# Patient Record
Sex: MALE | Race: AMERICAN INDIAN OR ALASKA NATIVE
[De-identification: names, ages, dates, MRNs, and addresses within clinical notes are randomized per-mention and may not be internally consistent; named-entity substitution may affect disease eponyms.]

---

## 2019-03-03 ENCOUNTER — HOSPITAL ENCOUNTER (EMERGENCY)
Dept: HOSPITAL 42 - ED | Age: 19
Discharge: HOME | End: 2019-03-03
Payer: SELF-PAY

## 2019-03-03 VITALS
DIASTOLIC BLOOD PRESSURE: 71 MMHG | TEMPERATURE: 98 F | SYSTOLIC BLOOD PRESSURE: 119 MMHG | RESPIRATION RATE: 19 BRPM | HEART RATE: 85 BPM

## 2019-03-03 VITALS — OXYGEN SATURATION: 99 %

## 2019-03-03 DIAGNOSIS — J11.1: Primary | ICD-10-CM

## 2019-03-03 DIAGNOSIS — F17.210: ICD-10-CM

## 2019-03-03 PROCEDURE — 96372 THER/PROPH/DIAG INJ SC/IM: CPT

## 2019-03-03 PROCEDURE — 87804 INFLUENZA ASSAY W/OPTIC: CPT

## 2019-03-03 PROCEDURE — 99283 EMERGENCY DEPT VISIT LOW MDM: CPT

## 2019-03-03 NOTE — ED PDOC
Arrival/HPI





- General


Historian: Patient





- History of Present Illness


Narrative History of Present Illness (Text): 





03/03/19 14:11


17 y/o male, no significant pmh, nkda, c/o runny nose/cough/fever/fatigue with 

bodyache x 2 days with no recent traveling.  Pt. has no objective temperature, 

feeling fatigue, no hemopytsis or night sweat, no chest pain or shortness of 

breath, no rash, no dizziness, no change in vision, no abdominal or pelvic pain,

no other medical or psychological complaints. 





<Kevyn Villafana - Last Filed: 03/03/19 15:35>





<Norman Crandall - Last Filed: 03/03/19 16:02>





- General


Chief Complaint: Flu-like Symptoms





Past Medical History





- Provider Review


Nursing Documentation Reviewed: Yes





- Infectious Disease


Hx of Infectious Diseases: None





- Psychiatric


Hx Substance Use: No





- Anesthesia


Hx Anesthesia: No


Hx Anesthesia Reactions: No


Hx Malignant Hyperthermia: No





<Kevyn Villafana - Last Filed: 03/03/19 15:35>





Family/Social History





- Physician Review


Nursing Documentation Reviewed: Yes


Family/Social History: Unknown Family HX


Smoking Status: Current Some Days Smoker


Hx Alcohol Use: No


Hx Substance Use: No





<Kevyn Villafana - Last Filed: 03/03/19 15:35>





Allergies/Home Meds





<Kevyn Villafana - Last Filed: 03/03/19 15:35>





<Norman Crandall - Last Filed: 03/03/19 16:02>


Allergies/Adverse Reactions: 


Allergies





No Known Allergies Allergy (Verified 03/03/19 14:10)


   











Review of Systems





- Review of Systems


Constitutional: Fatigue, Fevers


Eyes: absent: Vision Changes


ENT: Rhinorrhea.  absent: Hearing Changes


Respiratory: Cough.  absent: SOB, Sputum, Wheezing


Cardiovascular: absent: Chest Pain


Gastrointestinal: absent: Abdominal Pain, Diarrhea, Nausea, Vomiting


Musculoskeletal: Myalgias.  absent: Arthralgias, Back Pain, Neck Pain, Joint 

Swelling


Skin: absent: Rash, Pruritis


Neurological: absent: Headache, Dizziness


Psychiatric: absent: Anxiety, Depression, Suicidal Ideation





<Kevyn Villafana - Last Filed: 03/03/19 15:35>





Physical Exam


Vital Signs Reviewed: Yes





Vital Signs











  Temp Pulse Resp BP Pulse Ox


 


 03/03/19 14:04  97.5 F L  68  18  156/95 H  100











Temperature: Afebrile


Blood Pressure: Hypertensive


Pulse: Regular


Respiratory Rate: Normal


Appearance: Positive for: Well-Appearing, Non-Toxic, Comfortable


Pain Distress: Mild


Mental Status: Positive for: Alert and Oriented X 3





- Systems Exam


Head: Present: Atraumatic, Normocephalic


Pupils: Present: PERRL


Extroacular Muscles: Present: EOMI


Conjunctiva: Present: Normal


Mouth: Present: Moist Mucous Membranes


Nose (External): Present: Atraumatic.  No: Abrasion, Contusion, Laceration


Nose (Internal): Present: Normal Inspection, No Active Bleeding, Rhinorrhea.  

No: Septal Hematoma, Epistaxis


Neck: Present: Normal Range of Motion, Trachea Midline.  No: Meningeal Signs, 

MIDLINE TENDERNESS, Paraspinal Tenderness, Lymphadenopathy


Respiratory/Chest: Present: Clear to Auscultation, Good Air Exchange.  No: 

Respiratory Distress, Accessory Muscle Use, Wheezes, Decreased Breath Sounds, 

Rales, Retracting, Rhonchi, Tachypneic, Tender to Palpation


Cardiovascular: Present: Regular Rate and Rhythm, Normal S1, S2.  No: Murmurs


Abdomen: No: Tenderness, Distention, Peritoneal Signs, Rebound, Guarding


Back: Present: Normal Inspection


Upper Extremity: Present: Normal Inspection, Normal ROM, NORMAL PULSES, 

Neurovascularly Intact.  No: Cyanosis, Edema


Lower Extremity: Present: Normal Inspection, NORMAL PULSES, Normal ROM, 

Neurovascularly Intact, Capillary Refill < 2 s.  No: Edema, Deformity


Neurological: Present: GCS=15, CN II-XII Intact, Speech Normal, Motor Func 

Grossly Intact, Normal Cerebellar Funct, Gait Normal, Memory Normal


Skin: Present: Warm, Dry, Normal Color.  No: Rashes


Psychiatric: Present: Alert, Oriented x 3, Normal Insight, Normal Concentration





<Kevyn Villafana - Last Filed: 03/03/19 15:35>





Vital Signs











  Temp Pulse Resp BP Pulse Ox


 


 03/03/19 15:57  98 F  85  19  119/71  99


 


 03/03/19 15:55  97.2 F L  80  18  150/90  99


 


 03/03/19 14:04  97.5 F L  68  18  156/95 H  100














<Norman Crandall - Last Filed: 03/03/19 16:02>





Medical Decision Making


ED Course and Treatment: 





03/03/19 14:17


-rapid flu


-tylenol and toradol IM


-OBserve and reassess





03/03/19 15:04


-Rapid flu is negative, moderate to high suspicious, will treat.


-He has single episode of elevated BP, no cardiopulmonary or neurological 

complaints, advised he should see his own pmd in 2 days for repeat BP and 

treatment for elevated BP. 


-Discharge home with tamiflu, zithromax, bromfed dm, tylenol, bed rest, follow 

up with your own pmd within 2 days, return to the ER for any new or worsening 

signs or symptoms. 





<Kevyn Villafana - Last Filed: 03/03/19 15:35>





- Medication Orders


Current Medication Orders: 














Discontinued Medications





Acetaminophen (Tylenol 325mg Tab)  650 mg PO STAT STA


   Stop: 03/03/19 14:12


   Last Admin: 03/03/19 14:27  Dose: 650 mg





MAR Pain/Vitals


 Document     03/03/19 14:27  GMI  (Rec: 03/03/19 14:28  GMI  [place] [id number]


     Pain Reassessment


      Is This A Pain ReAssessment?               Yes


     Sleep


      Is patient sleeping during reassessment?   No


     Presence of Pain


      Presence of Pain                           Yes


     Pain Scale Used


     Protocol:  PSCALES


      Pain Scale Used                            Numeric





Ketorolac Tromethamine (Toradol)  60 mg IM STAT STA


   Stop: 03/03/19 14:12


   Last Admin: 03/03/19 14:28  Dose: 60 mg





MAR Pain Assessment


 Document     03/03/19 14:28  GMI  (Rec: 03/03/19 14:31  GMI  [facility]-)


     Pain Reassessment


      Is this a pain reassessment?               Yes


     Sleep


      Is patient sleeping during reassessment?   No


     Presence of Pain


      Presence of Pain                           Yes


IM Administration Charges


 Document     03/03/19 14:28  GMI  (Rec: 03/03/19 14:31  GMI  [facility]-)


     Injection Site


      MAR Injection Site                         Right Deltoid


     Charges for Administration


      # of IM Administrations                    1














<Norman Crandall - Last Filed: 03/03/19 16:02>





- PA / NP / Resident Statement


WILBUR has reviewed & agrees with the documentation as recorded.





<Kevyn Villafana - Last Filed: 03/03/19 15:35>





- PA / NP / Resident Statement


WILBUR has reviewed & agrees with the documentation as recorded.





<Norman Crandall - Last Filed: 03/03/19 16:02>





Disposition/Present on Arrival





- Present on Arrival


Any Indicators Present on Arrival: No


History of DVT/PE: No


History of Uncontrolled Diabetes: No


Urinary Catheter: No


History of Decub. Ulcer: No


History Surgical Site Infection Following: None





- Disposition


Have Diagnosis and Disposition been Completed?: Yes


Disposition Time: 15:15


Patient Plan: Discharge





<Kevyn Villafana - Last Filed: 03/03/19 15:35>





<Norman Crandall - Last Filed: 03/03/19 16:02>





- Disposition


Diagnosis: 


 Flu-like symptoms, URI (upper respiratory infection)





Disposition: HOME/ ROUTINE


Condition: IMPROVED


Additional Instructions: 


-Discharge home with tamiflu, zithromax, bromfed dm, tylenol, bed rest, follow 

up with your own pmd within 2 days, return to the ER for any new or worsening 

signs or symptoms. 


Prescriptions: 


Acetaminophen [Tylenol 325mg tab] 2 tab PO QID PRN #30 tab


 PRN Reason: Other


Azithromycin [Z-Hola] 250 mg PO DAILY #6 tab


Brompheniramine/Pseudoephed/Dm [Bromfed Dm Cough 118 ml] 10 ml PO QID PRN #250 

ml


 PRN Reason: Other


Oseltamivir Cap [Tamiflu] 75 mg PO BID #10 cap


Referrals: 


PCP,NO [Primary Care Provider] - Follow up with primary


[facility] [place] with primary


Forms:  Page Mage (English), WORK NOTE